# Patient Record
Sex: FEMALE | Race: WHITE | Employment: STUDENT | URBAN - METROPOLITAN AREA
[De-identification: names, ages, dates, MRNs, and addresses within clinical notes are randomized per-mention and may not be internally consistent; named-entity substitution may affect disease eponyms.]

---
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
## 2023-02-27 ENCOUNTER — TELEPHONE (OUTPATIENT)
Dept: FAMILY MEDICINE CLINIC | Facility: CLINIC | Age: 23
End: 2023-02-27

## 2023-02-27 NOTE — TELEPHONE ENCOUNTER
Tejas Tello, Nurse with [facility] calling. Asking for our fax number. RN provided OPO fax:   [phone]      And provided number for medical records as well. She verbalizes understanding of all information.

## 2023-06-28 ENCOUNTER — APPOINTMENT (OUTPATIENT)
Dept: GENERAL RADIOLOGY | Age: 23
End: 2023-06-28
Attending: PHYSICIAN ASSISTANT

## 2023-06-28 ENCOUNTER — APPOINTMENT (OUTPATIENT)
Dept: CT IMAGING | Age: 23
End: 2023-06-28
Attending: EMERGENCY MEDICINE

## 2023-06-28 ENCOUNTER — HOSPITAL ENCOUNTER (EMERGENCY)
Age: 23
Discharge: HOME | End: 2023-06-28
Attending: EMERGENCY MEDICINE

## 2023-06-28 VITALS
DIASTOLIC BLOOD PRESSURE: 71 MMHG | OXYGEN SATURATION: 98 % | HEIGHT: 63 IN | SYSTOLIC BLOOD PRESSURE: 107 MMHG | HEART RATE: 79 BPM | TEMPERATURE: 98.2 F | WEIGHT: 135 LBS | BODY MASS INDEX: 23.92 KG/M2 | RESPIRATION RATE: 17 BRPM

## 2023-06-28 DIAGNOSIS — R10.9 FLANK PAIN: ICD-10-CM

## 2023-06-28 DIAGNOSIS — R07.9 CHEST PAIN, UNSPECIFIED TYPE: ICD-10-CM

## 2023-06-28 DIAGNOSIS — N39.0 URINARY TRACT INFECTION WITHOUT HEMATURIA, SITE UNSPECIFIED: Primary | ICD-10-CM

## 2023-06-28 LAB
ALBUMIN SERPL-MCNC: 3.8 G/DL (ref 3.6–5.1)
ALBUMIN/GLOB SERPL: 1 {RATIO} (ref 1–2.4)
ALP SERPL-CCNC: 45 UNITS/L (ref 45–117)
ALT SERPL-CCNC: 22 UNITS/L
ANION GAP SERPL CALC-SCNC: 13 MMOL/L (ref 7–19)
APPEARANCE UR: ABNORMAL
APPEARANCE UR: CLEAR
AST SERPL-CCNC: 26 UNITS/L
BACTERIA #/AREA URNS HPF: ABNORMAL /HPF
BASOPHILS # BLD: 0 K/MCL (ref 0–0.3)
BASOPHILS NFR BLD: 0 %
BILIRUB SERPL-MCNC: 1.9 MG/DL (ref 0.2–1)
BILIRUB UR QL STRIP: NEGATIVE
BILIRUB UR QL STRIP: NEGATIVE
BUN SERPL-MCNC: 12 MG/DL (ref 6–20)
BUN/CREAT SERPL: 17 (ref 7–25)
CALCIUM SERPL-MCNC: 9 MG/DL (ref 8.4–10.2)
CHLORIDE SERPL-SCNC: 105 MMOL/L (ref 97–110)
CO2 SERPL-SCNC: 29 MMOL/L (ref 21–32)
COLOR UR: ABNORMAL
COLOR UR: YELLOW
CREAT SERPL-MCNC: 0.72 MG/DL (ref 0.51–0.95)
D DIMER PPP FEU-MCNC: 0.26 MG/L (FEU)
DEPRECATED RDW RBC: 38.7 FL (ref 39–50)
EOSINOPHIL # BLD: 0.2 K/MCL (ref 0–0.5)
EOSINOPHIL NFR BLD: 3 %
ERYTHROCYTE [DISTWIDTH] IN BLOOD: 13 % (ref 11–15)
FASTING DURATION TIME PATIENT: ABNORMAL H
GFR SERPLBLD BASED ON 1.73 SQ M-ARVRAT: >90 ML/MIN
GLOBULIN SER-MCNC: 3.8 G/DL (ref 2–4)
GLUCOSE SERPL-MCNC: 99 MG/DL (ref 70–99)
GLUCOSE UR STRIP-MCNC: NEGATIVE MG/DL
GLUCOSE UR STRIP-MCNC: NEGATIVE MG/DL
HCG UR QL: NEGATIVE
HCT VFR BLD CALC: 41.5 % (ref 36–46.5)
HGB BLD-MCNC: 13.8 G/DL (ref 12–15.5)
HGB UR QL STRIP: ABNORMAL
HGB UR QL STRIP: ABNORMAL
HYALINE CASTS #/AREA URNS LPF: ABNORMAL /LPF
IMM GRANULOCYTES # BLD AUTO: 0 K/MCL (ref 0–0.2)
IMM GRANULOCYTES # BLD: 0 %
KETONES UR STRIP-MCNC: NEGATIVE MG/DL
KETONES UR STRIP-MCNC: NEGATIVE MG/DL
LEUKOCYTE ESTERASE UR QL STRIP: ABNORMAL
LEUKOCYTE ESTERASE UR QL STRIP: ABNORMAL
LYMPHOCYTES # BLD: 2.1 K/MCL (ref 1–4.8)
LYMPHOCYTES NFR BLD: 28 %
MAGNESIUM SERPL-MCNC: 1.8 MG/DL (ref 1.7–2.4)
MCH RBC QN AUTO: 27.2 PG (ref 26–34)
MCHC RBC AUTO-ENTMCNC: 33.3 G/DL (ref 32–36.5)
MCV RBC AUTO: 81.7 FL (ref 78–100)
MONOCYTES # BLD: 0.5 K/MCL (ref 0.3–0.9)
MONOCYTES NFR BLD: 7 %
MUCOUS THREADS URNS QL MICRO: PRESENT
NEUTROPHILS # BLD: 4.4 K/MCL (ref 1.8–7.7)
NEUTROPHILS NFR BLD: 62 %
NITRITE UR QL STRIP: NEGATIVE
NITRITE UR QL STRIP: NEGATIVE
NRBC BLD MANUAL-RTO: 0 /100 WBC
PH UR STRIP: 7.5 UNITS (ref 5–7)
PH UR STRIP: 7.5 [PH] (ref 5–7)
PLATELET # BLD AUTO: 193 K/MCL (ref 140–450)
POTASSIUM SERPL-SCNC: 4.5 MMOL/L (ref 3.4–5.1)
PROT SERPL-MCNC: 7.6 G/DL (ref 6.4–8.2)
PROT UR STRIP-MCNC: ABNORMAL MG/DL
PROT UR STRIP-MCNC: NEGATIVE MG/DL
RBC # BLD: 5.08 MIL/MCL (ref 4–5.2)
RBC #/AREA URNS HPF: ABNORMAL /HPF
SODIUM SERPL-SCNC: 142 MMOL/L (ref 135–145)
SP GR UR STRIP: 1.01 (ref 1–1.03)
SP GR UR STRIP: 1.02 (ref 1–1.03)
SQUAMOUS #/AREA URNS HPF: ABNORMAL /HPF
TROPONIN I SERPL DL<=0.01 NG/ML-MCNC: <4 NG/L
UROBILINOGEN UR STRIP-MCNC: 0.2 MG/DL
UROBILINOGEN UR STRIP-MCNC: 0.2 MG/DL
WBC # BLD: 7.2 K/MCL (ref 4.2–11)
WBC #/AREA URNS HPF: ABNORMAL /HPF

## 2023-06-28 PROCEDURE — 80053 COMPREHEN METABOLIC PANEL: CPT | Performed by: PHYSICIAN ASSISTANT

## 2023-06-28 PROCEDURE — 84484 ASSAY OF TROPONIN QUANT: CPT | Performed by: PHYSICIAN ASSISTANT

## 2023-06-28 PROCEDURE — 85025 COMPLETE CBC W/AUTO DIFF WBC: CPT | Performed by: PHYSICIAN ASSISTANT

## 2023-06-28 PROCEDURE — 85379 FIBRIN DEGRADATION QUANT: CPT | Performed by: EMERGENCY MEDICINE

## 2023-06-28 PROCEDURE — 87077 CULTURE AEROBIC IDENTIFY: CPT | Performed by: EMERGENCY MEDICINE

## 2023-06-28 PROCEDURE — 36415 COLL VENOUS BLD VENIPUNCTURE: CPT

## 2023-06-28 PROCEDURE — 74176 CT ABD & PELVIS W/O CONTRAST: CPT

## 2023-06-28 PROCEDURE — 81003 URINALYSIS AUTO W/O SCOPE: CPT

## 2023-06-28 PROCEDURE — 93005 ELECTROCARDIOGRAM TRACING: CPT | Performed by: PHYSICIAN ASSISTANT

## 2023-06-28 PROCEDURE — 99285 EMERGENCY DEPT VISIT HI MDM: CPT

## 2023-06-28 PROCEDURE — 84703 CHORIONIC GONADOTROPIN ASSAY: CPT

## 2023-06-28 PROCEDURE — G1004 CDSM NDSC: HCPCS

## 2023-06-28 PROCEDURE — 83735 ASSAY OF MAGNESIUM: CPT | Performed by: PHYSICIAN ASSISTANT

## 2023-06-28 PROCEDURE — 71045 X-RAY EXAM CHEST 1 VIEW: CPT

## 2023-06-28 PROCEDURE — 81001 URINALYSIS AUTO W/SCOPE: CPT | Performed by: EMERGENCY MEDICINE

## 2023-06-28 RX ORDER — CEPHALEXIN 500 MG/1
500 CAPSULE ORAL 3 TIMES DAILY
Qty: 21 CAPSULE | Refills: 0 | Status: SHIPPED | OUTPATIENT
Start: 2023-06-28 | End: 2023-07-05

## 2023-06-28 ASSESSMENT — ENCOUNTER SYMPTOMS
CHILLS: 0
SHORTNESS OF BREATH: 0
HEADACHES: 0
ENDOCRINE NEGATIVE: 1
TROUBLE SWALLOWING: 0
STRIDOR: 0
COLOR CHANGE: 0
AGITATION: 0
DIZZINESS: 0
ABDOMINAL PAIN: 0
ABDOMINAL DISTENTION: 0
NUMBNESS: 0
EYE DISCHARGE: 0
CONSTITUTIONAL NEGATIVE: 1
ALLERGIC/IMMUNOLOGIC NEGATIVE: 1
VOMITING: 0
CHEST TIGHTNESS: 0
WHEEZING: 0
EYE REDNESS: 0
WEAKNESS: 0
NAUSEA: 0
EYE ITCHING: 0
SEIZURES: 0
FEVER: 0
HEMATOLOGIC/LYMPHATIC NEGATIVE: 1
FATIGUE: 0
FACIAL SWELLING: 0

## 2023-06-29 LAB
ATRIAL RATE (BPM): 81
BACTERIA UR CULT: ABNORMAL
P AXIS (DEGREES): 33
PR-INTERVAL (MSEC): 116
QRS-INTERVAL (MSEC): 76
QT-INTERVAL (MSEC): 364
QTC: 422
R AXIS (DEGREES): 66
RAINBOW EXTRA TUBES HOLD SPECIMEN: NORMAL
REPORT TEXT: NORMAL
T AXIS (DEGREES): 21
VENTRICULAR RATE EKG/MIN (BPM): 81

## 2024-02-23 ENCOUNTER — OFFICE VISIT (OUTPATIENT)
Dept: OBGYN CLINIC | Facility: CLINIC | Age: 24
End: 2024-02-23

## 2024-02-23 VITALS
HEIGHT: 62 IN | HEART RATE: 76 BPM | DIASTOLIC BLOOD PRESSURE: 75 MMHG | WEIGHT: 154 LBS | SYSTOLIC BLOOD PRESSURE: 120 MMHG | BODY MASS INDEX: 28.34 KG/M2

## 2024-02-23 DIAGNOSIS — Z83.2 FHX: FACTOR V LEIDEN MUTATION: ICD-10-CM

## 2024-02-23 DIAGNOSIS — Z32.01 PREGNANCY EXAMINATION OR TEST, POSITIVE RESULT (HCC): ICD-10-CM

## 2024-02-23 DIAGNOSIS — N92.6 MISSED MENSES: Primary | ICD-10-CM

## 2024-02-23 LAB
CONTROL LINE PRESENT WITH A CLEAR BACKGROUND (YES/NO): YES YES/NO
KIT LOT #: NORMAL NUMERIC
PREGNANCY TEST, URINE: POSITIVE

## 2024-02-23 PROCEDURE — 81025 URINE PREGNANCY TEST: CPT | Performed by: ADVANCED PRACTICE MIDWIFE

## 2024-02-23 PROCEDURE — 99203 OFFICE O/P NEW LOW 30 MIN: CPT | Performed by: ADVANCED PRACTICE MIDWIFE

## 2024-02-23 NOTE — PROGRESS NOTES
Subjective:   Patient ID: Tayler Forbes is a 23 year old female.    Gyn Exam    Patient presents for positive home pregnancy test. LMP 1/12/24. Patient reports pregnancy was unplanned. Reports good feelings about pregnancy.    Patient in monogamous relationship, with one male partner. Patient reports being in safe relationship.  Adequate family support.    Family hx: Maternal aunt had factor V Leiden mutation    History/Other:   Review of Systems   Constitutional: Negative.    Respiratory: Negative.     Cardiovascular: Negative.    Gastrointestinal: Negative.    Genitourinary: Negative.    Hematological: Negative.      No current outpatient medications on file.     Allergies:No Known Allergies    Objective:   Physical Exam  Vitals reviewed.   Pulmonary:      Effort: Pulmonary effort is normal.   Psychiatric:         Attention and Perception: Attention normal.         Mood and Affect: Mood normal.         Behavior: Behavior normal. Hyperactive: factor V leiden. Behavior is cooperative.         Judgment: Judgment normal.         Assessment & Plan:   1. Missed menses    2. Pregnancy examination or test, positive result (HCC)    3. FHx: factor V Leiden mutation    1.  Discussed importance of folic acid, calcium, vitamin D.   2.  Reviewed pregnancy recommendations regarding weight gain, diet, fish consumption, consumption of deli-meats and hot dogs, making sure food is appropriately cooked and washed to avoid food-borne illnesses.    3.  Travel discussed, avoid travel to Covid zones, use of support stockings with flights longer than 3 hours, movement every 2-3 hours if driving  4.  Discussed exercise, avoid jacuzzi, sauna, hot tubs and steam rooms.   5.  Discussed avoidance of alcohol, smoking, and minimizing caffeine.    6.  Warning signs reviewed advised to call office if occur.    7.  First Trimester chromosomal screening Cell free Fetal DNA and US schedule discussed.  Desires First trimester screen testing  8.  Genetic screening and ACOG/ ACMG guidelines for CF, SMA, Fragile X & hemoglobinopathies.  Informrd our offices uses the Preparent Standard Panel but other sources are available.  If interested they should check their insurance and various costs  9. ONTD risks discussed. No risk factors identified.  Start 4000 mcg Folic acid.   10. Pre- Eclampsia Risk Assessment:   High risk Factors- none  Moderate Risk Factors: Nullip  11.  Schedule RN OB ED visit   12.  30 minutes face to face counseling, chart review, orders and coordination of care  13. Discussed early ultrasound for verification of dates of pregnancy. Ultrasound order placed    Blood testing for Factor 5 Jabier ordered    Orders Placed This Encounter   Procedures    POC Urine pregnancy test [74181]    Factor V Leiden Mutation       Meds This Visit:  Requested Prescriptions      No prescriptions requested or ordered in this encounter       Imaging & Referrals:  US PREG 1ST TRIMESTER (CPT=76801)      Assessment and Plan completed by Lacy JUAREZ under the direct supervision of Kandi Leonard CNM    I personally performed the patient's exam and medical decision making on this date of service.  I was physically present in the room for the performance of the E/M service.  I have reviewed the JCARLOS student's documentation and findings including history, Exam, and Medical Decision Making, edited the document for accuracy and verify that it represents the clinical findings and services performed.

## 2024-02-23 NOTE — PROGRESS NOTES
I personally performed the patient's exam and medical decision making on this date of service.  I was physically present in the room for the performance of the E/M service.  I have reviewed the CNM student's documentation and findings including history, Exam, and Medical Decision Making, edited the document for accuracy and verify that it represents the clinical findings and services performed.     Diagnoses and all orders for this visit:    Missed menses  -     POC Urine pregnancy test [90224]  -     US PREG 1ST TRIMESTER (CPT=76801); Future    Pregnancy examination or test, positive result (HCC)    FHx: factor V Leiden mutation  -     Factor V Leiden Mutation

## 2024-02-28 ENCOUNTER — HOSPITAL ENCOUNTER (OUTPATIENT)
Dept: ULTRASOUND IMAGING | Facility: HOSPITAL | Age: 24
Discharge: HOME | End: 2024-02-28
Attending: ADVANCED PRACTICE MIDWIFE
Payer: COMMERCIAL

## 2024-02-28 DIAGNOSIS — N92.6 MISSED MENSES: ICD-10-CM

## 2024-02-28 PROCEDURE — 76817 TRANSVAGINAL US OBSTETRIC: CPT | Performed by: ADVANCED PRACTICE MIDWIFE

## 2024-02-28 PROCEDURE — 76801 OB US < 14 WKS SINGLE FETUS: CPT | Performed by: ADVANCED PRACTICE MIDWIFE

## 2024-02-29 ENCOUNTER — TELEPHONE (OUTPATIENT)
Dept: OBGYN CLINIC | Facility: CLINIC | Age: 24
End: 2024-02-29

## 2024-02-29 NOTE — TELEPHONE ENCOUNTER
Name and  verified    Patient given MES results and voiced understanding. Has nurse education schedule, aware this is a phone call.

## 2024-02-29 NOTE — TELEPHONE ENCOUNTER
----- Message from Kandi Leonard CNM sent at 2/29/2024  1:01 PM CST -----  Please call:  Single live early intrauterine gestation measures 6 weeks 6 days.  SINDHU 10/17/2024.'    Needs RN ed visit

## 2024-03-06 ENCOUNTER — NURSE ONLY (OUTPATIENT)
Dept: OBGYN CLINIC | Facility: CLINIC | Age: 24
End: 2024-03-06
Payer: COMMERCIAL

## 2024-03-06 DIAGNOSIS — Z34.01 ENCOUNTER FOR SUPERVISION OF NORMAL FIRST PREGNANCY IN FIRST TRIMESTER (HCC): Primary | ICD-10-CM

## 2024-03-06 NOTE — PROGRESS NOTES
Pt Name and  verified.    OB History    Para Term  AB Living   1 0 0 0 0 0   SAB IAB Ectopic Multiple Live Births   0 0 0 0 0     Pt called today for RN OB Education. Theodosia of MW through her mother, she is a pt of [facility]. Vaginal delivery un-medicated if possible.   LMP: 2024     Pre  BMI: 27.06    EPDS score: 0/30    Working SINDHU: 10/08/2024   Hx of genetic abnormality in family: none, partners brothers have moderate autism.   Covid Vaccine: not vaccinated     Sterilization/Contraception: abstaining, no other form of pregnancy     OUD Screening: Pt. Has answered NO 5P questions and has NO  risk factors.    Pt. Given What pregnant women need to know handout.    SDOH Screening: Low Risk    Educational material reviewed with patient: Prenatal care, nutrition, weight gain recommendations, travel, exercise, intercourse, pregnancy changes, safe medications, pregnancy and work, fetal movement, labor and  labor, warning signs, food safety, tdap, cord blood, breastfeeding breast, circumcision yes, and Group B strep.     Blood transfusion if needed: yes, if needed in PN care.      PN labs: placed and pt made aware to complete prior to next apt.     Optional genetic screening labs were reviewed: Cell FreeDNA, FTS with US, Quad screen MSAFP and CF screening. Ciarra testing to be done.     [facility] Policy: reviewed and VU.    NOB apt:  TM scheduled.

## 2024-03-07 ENCOUNTER — TELEPHONE (OUTPATIENT)
Dept: OBGYN CLINIC | Facility: CLINIC | Age: 24
End: 2024-03-07

## 2024-03-07 NOTE — TELEPHONE ENCOUNTER
Patient request for a nurse to call, patient states on 03/29/24 will come in to do  blood work.   Patient ask on that day will she get a test to tell the gender of the baby.

## 2024-03-07 NOTE — TELEPHONE ENCOUNTER
Name and  verified    Patient informed dilia kit is ready, but can look into pricing of [id number] if desires. Number provided for insurance price estimate.

## 2024-03-29 ENCOUNTER — LAB ENCOUNTER (OUTPATIENT)
Dept: LAB | Facility: HOSPITAL | Age: 24
End: 2024-03-29
Attending: ADVANCED PRACTICE MIDWIFE
Payer: COMMERCIAL

## 2024-03-29 ENCOUNTER — INITIAL PRENATAL (OUTPATIENT)
Dept: OBGYN CLINIC | Facility: CLINIC | Age: 24
End: 2024-03-29
Payer: COMMERCIAL

## 2024-03-29 VITALS
BODY MASS INDEX: 28 KG/M2 | WEIGHT: 151 LBS | DIASTOLIC BLOOD PRESSURE: 75 MMHG | SYSTOLIC BLOOD PRESSURE: 110 MMHG | HEART RATE: 88 BPM

## 2024-03-29 DIAGNOSIS — Z11.3 SCREEN FOR STD (SEXUALLY TRANSMITTED DISEASE): ICD-10-CM

## 2024-03-29 DIAGNOSIS — Z83.2 FHX: FACTOR V LEIDEN MUTATION: ICD-10-CM

## 2024-03-29 DIAGNOSIS — Z34.01 PRENATAL CARE, FIRST PREGNANCY IN FIRST TRIMESTER (HCC): Primary | ICD-10-CM

## 2024-03-29 DIAGNOSIS — R00.2 PALPITATIONS: ICD-10-CM

## 2024-03-29 DIAGNOSIS — Z12.4 PAP SMEAR FOR CERVICAL CANCER SCREENING: ICD-10-CM

## 2024-03-29 DIAGNOSIS — Z34.01 ENCOUNTER FOR SUPERVISION OF NORMAL FIRST PREGNANCY IN FIRST TRIMESTER (HCC): ICD-10-CM

## 2024-03-29 DIAGNOSIS — I47.10 SVT (SUPRAVENTRICULAR TACHYCARDIA) (HCC): ICD-10-CM

## 2024-03-29 LAB
ANTIBODY SCREEN: NEGATIVE
BASOPHILS # BLD AUTO: 0.01 X10(3) UL (ref 0–0.2)
BASOPHILS NFR BLD AUTO: 0.2 %
BILIRUB UR QL: NEGATIVE
CLARITY UR: CLEAR
COLOR UR: YELLOW
DEPRECATED RDW RBC AUTO: 37.9 FL (ref 35.1–46.3)
EOSINOPHIL # BLD AUTO: 0.05 X10(3) UL (ref 0–0.7)
EOSINOPHIL NFR BLD AUTO: 1.1 %
ERYTHROCYTE [DISTWIDTH] IN BLOOD BY AUTOMATED COUNT: 12.3 % (ref 11–15)
EST. AVERAGE GLUCOSE BLD GHB EST-MCNC: 111 MG/DL (ref 68–126)
GLUCOSE UR-MCNC: NORMAL MG/DL
HBA1C MFR BLD: 5.5 % (ref ?–5.7)
HCT VFR BLD AUTO: 40.9 %
HGB BLD-MCNC: 13.5 G/DL
HGB UR QL STRIP.AUTO: NEGATIVE
IMM GRANULOCYTES # BLD AUTO: 0.01 X10(3) UL (ref 0–1)
IMM GRANULOCYTES NFR BLD: 0.2 %
KETONES UR-MCNC: 20 MG/DL
LEUKOCYTE ESTERASE UR QL STRIP.AUTO: NEGATIVE
LYMPHOCYTES # BLD AUTO: 0.94 X10(3) UL (ref 1–4)
LYMPHOCYTES NFR BLD AUTO: 21 %
MCH RBC QN AUTO: 27.9 PG (ref 26–34)
MCHC RBC AUTO-ENTMCNC: 33 G/DL (ref 31–37)
MCV RBC AUTO: 84.5 FL
MONOCYTES # BLD AUTO: 0.43 X10(3) UL (ref 0.1–1)
MONOCYTES NFR BLD AUTO: 9.6 %
NEUTROPHILS # BLD AUTO: 3.04 X10 (3) UL (ref 1.5–7.7)
NEUTROPHILS # BLD AUTO: 3.04 X10(3) UL (ref 1.5–7.7)
NEUTROPHILS NFR BLD AUTO: 67.9 %
NITRITE UR QL STRIP.AUTO: NEGATIVE
PH UR: 6.5 [PH] (ref 5–8)
PLATELET # BLD AUTO: 185 10(3)UL (ref 150–450)
RBC # BLD AUTO: 4.84 X10(6)UL
RH BLOOD TYPE: POSITIVE
SP GR UR STRIP: 1.02 (ref 1–1.03)
UROBILINOGEN UR STRIP-ACNC: NORMAL
WBC # BLD AUTO: 4.5 X10(3) UL (ref 4–11)

## 2024-03-29 PROCEDURE — 83036 HEMOGLOBIN GLYCOSYLATED A1C: CPT

## 2024-03-29 PROCEDURE — 83020 HEMOGLOBIN ELECTROPHORESIS: CPT

## 2024-03-29 PROCEDURE — 86787 VARICELLA-ZOSTER ANTIBODY: CPT

## 2024-03-29 PROCEDURE — 83021 HEMOGLOBIN CHROMOTOGRAPHY: CPT

## 2024-03-29 PROCEDURE — 86780 TREPONEMA PALLIDUM: CPT

## 2024-03-29 PROCEDURE — 86850 RBC ANTIBODY SCREEN: CPT

## 2024-03-29 PROCEDURE — 86900 BLOOD TYPING SEROLOGIC ABO: CPT

## 2024-03-29 PROCEDURE — 87389 HIV-1 AG W/HIV-1&-2 AB AG IA: CPT

## 2024-03-29 PROCEDURE — 86762 RUBELLA ANTIBODY: CPT

## 2024-03-29 PROCEDURE — 36415 COLL VENOUS BLD VENIPUNCTURE: CPT

## 2024-03-29 PROCEDURE — 81003 URINALYSIS AUTO W/O SCOPE: CPT

## 2024-03-29 PROCEDURE — 85025 COMPLETE CBC W/AUTO DIFF WBC: CPT

## 2024-03-29 PROCEDURE — 86901 BLOOD TYPING SEROLOGIC RH(D): CPT

## 2024-03-29 PROCEDURE — 81241 F5 GENE: CPT | Performed by: ADVANCED PRACTICE MIDWIFE

## 2024-03-29 PROCEDURE — 86803 HEPATITIS C AB TEST: CPT

## 2024-03-29 PROCEDURE — 81240 F2 GENE: CPT | Performed by: ADVANCED PRACTICE MIDWIFE

## 2024-03-29 PROCEDURE — 87340 HEPATITIS B SURFACE AG IA: CPT

## 2024-03-29 PROCEDURE — 87086 URINE CULTURE/COLONY COUNT: CPT

## 2024-03-29 NOTE — PROGRESS NOTES
Tayler, , is at 12w3d, here for her NOB visit.  Currently, she is feeling well. Denies 1st trimester danger signs.     NOB labs- not completed,   Genetic screening- looking into insurance.  Ultrasound: 24 measuring 6 weeks 6 days. Re-dated pregnancy  Med hx: Periods of SVT that happen about once a month, lasting one minute. No medication. This information was not revealed to provider at missed menses visit  Family hx: factor V leiden- two maternal aunts  Allergies: NKDA   Problem list- Updated  Abuse/Feel safe in home- Lives with parents in safe enviroment  [unfilled] 0    Pre-e risk:   Prior births:none    Physical: completed  Pap: collected today    Lacy JUAREZ and Lilia Dove CNM present for exam    Vital signs and weight reviewed  See flowsheets  Prenatal Physical  Unable to heart FHTs via doppler    Patient Active Problem List   Diagnosis    FHx: factor V Leiden mutation    Palpitations    SVT (supraventricular tachycardia)      Assessment/Plan: PE, pap, and GC/CT today. Patient to notify us if she would like NIPT testing ordered  SINDHU adjusted due to ultrasound on . Dating tab updated  Cardiology consult placed due to H/O of SVT. Will consider transfer of care pending cardiology evaluation  Next visit: 4weeks    Reviewed:   Prenatal visit schedule  CNM care  Emergency contact info   1st trimester precautions and expectations  Danger signs    Pt verbalized understanding. All questions answered. No barriers to learning identified    Assessment and Plan Completed by Lacy JUAREZ under the direct supervision of Lilia Dove CNM    Patient seen in conjunction with [facility]. I personally witnessed the patient's exam and medical decision making on this date of service.  I was physically present in the room for the performance of the E/M service.  I have reviewed the JCARLOS student's documentation and findings including history, Exam, and Medical Decision Making, edited the document for accuracy and  verify that it represents the clinical findings and services performed.

## 2024-03-29 NOTE — PROGRESS NOTES
Patient seen in conjunction with [facility]. I personally witnessed the patient's exam and medical decision making on this date of service.  I was physically present in the room for the performance of the E/M service.  I have reviewed the CNM student's documentation and findings including history, Exam, and Medical Decision Making, edited the document for accuracy and verify that it represents the clinical findings and services performed.

## 2024-03-29 NOTE — PATIENT INSTRUCTIONS
Healthy Eating Habits During Pregnancy    It’s important to develop healthy eating habits while you are pregnant, for you as well as for your baby. Here are some ways to stay healthy.  Aim for a healthy weight  A slow, steady rate of weight gain is often best. After the first trimester, you may gain about a pound a week. If you were overweight before pregnancy, you need to gain fewer pounds. Your healthcare provider can give you a healthy weight goal for your pregnancy.  Don’t diet  Now is not the time to diet. You may not get enough of the nutrients you and your baby need. Instead, learn how to be a healthy eater. Start by doing it for your baby. Soon, you may do it for yourself.  Vitamins and supplements  Talk with your healthcare provider about taking these and other prenatal vitamins and supplements.  Iron makes the extra blood you need now.  Calcium and vitamin D help build and keep strong bones.  Folic acid helps prevent certain birth defects.  Iodine helps the thyroid work right.  Some vitamins may not be safe to take. Your healthcare provider will tell you which ones to avoid.  Fluids  Drink at least 8 to 10 cups of fluid daily. Your baby needs fluids. Fluids also decrease constipation, flush out toxins and waste, limit swelling, and help prevent bladder infections. Water is best. Other good choices are:  Water or seltzer water with a slice of lemon or lime (These can also help ease an upset stomach.)  Clear soups that are low in salt  Low-fat or fat-free milk, soy or rice milk with calcium added  Popsicles or gelatin  Things to avoid  Some things might harm your growing baby. Don’t eat or drink:  Alcohol  Unpasteurized dairy foods and juices  Raw or undercooked meat, poultry, fish, or eggs  Unwashed fruits and vegetables  Prepared meats, like deli meats or hot dogs, unless heated until steaming hot  Fish that are high in mercury, like shark, swordfish, melisa mackerel, tilefish, and albacore tuna  Things to  limit  Ask your healthcare provider whether it’s safe to eat or drink:  Caffeine  Artificial sweeteners  Organ meats  Certain types of fish  Fish and shellfish that contain mercury in lower amounts, like shrimp, canned light tuna, salmon, pollock, and catfish  Tobi last reviewed this educational content on 2018 [employer]. [address] [place] [address], [place] [address]. All rights reserved. This information is not intended as a substitute for professional medical care. Always follow your healthcare professional's instructions.        Nutrition During Pregnancy    Having a healthy baby depends mostly on you. What you eat matters to your baby and your health. During pregnancy, you will likely need about 300 more calories per day than before you became pregnant. Each day, try to eat the number of servings listed here for each food group. In addition, cut down on salt and caffeine. Limit the amount of sweets and high-fat foods you eat. Don’t smoke or drink alcohol.  Important: See your healthcare provider as often as requested. If you have any questions, be sure to ask them.  Fruits  2 cups  Examples of 1-cup servings:  1 medium apple  1 medium orange  1 medium banana  1 cup chopped fruit  1 cup 100% fruit juice (pasteurized)  1/2 cup dried fruit Vegetables  2-1/2 to 3 cups   Examples of 1 servin cups raw, leafy greens  1 cup raw or cooked cut-up vegetables  1 cup 100% vegetable juice (pasteurized) Grains & Cereals*  6 to 8 ounces  Examples of 1-ounce servings:  1 slice bread  1/2 cup cooked rice  1/2 cup cooked cereal  1/2 cup pasta  1 ounce cold cereal Fats & Oils  6 to 8 teaspoons   Dairy**  3 cups  Examples of 1-cup servings:  1 cup milk  1 cup yogurt  1-1/2 ounces natural cheese  2 ounces processed cheese Protein---  5 to 6-1/2 ounces  Examples of 1-ounce servings:  1 egg  1 ounce of lean meat, poultry, or fish  1/4 cup cooked beans  1 tablespoon peanut butter  1/2 ounce nuts  Fluids  8 or more 8-ounce glasses  Examples:  Water  Diluted juices: Apple, orange, cranberry  Mineral water  Clear soups, broth     *Note: Choose whole grains whenever possible.  ** Note: Try to choose low-fat options; avoid soft cheeses and unpasteurized milk.  --- Notes: Avoid raw or undercooked meats, eggs, and seafood. fish, and shellfish. Also, some types of fish, like shark, swordfish, and melisa mackerel should not be eaten during pregnancy. Avoid hot dogs, luncheon meats, and cold cuts unless heated to steaming just before being served. Ask your healthcare provider about safe choices.     Prenatal supplements  A prenatal supplement is a pill that you take daily during pregnancy. It helps make sure you’re getting the right amount of certain nutrients that are important to your baby. Ask your healthcare provider to help you choose the best one for you. Important nutrients during pregnancy include:  Folic acid. It's best to start taking this supplement 1 month before you start trying to get pregnant. Folic acid helps prevent certain problems in your baby. During pregnancy, you need to take 400 micrograms (mcg) of folic acid every day for the first 2 to 3 months after conception, and then 600 mcg is needed for growing fetus and placenta.  Iron, calcium, and vitamin D. You may also be advised to take these supplements during pregnancy. They help keep you and your baby healthy. Be sure to take them at different times because calcium makes it hard for the body to absorb iron. Taking iron with orange juice helps to increase its absorption.   [employer] last reviewed this educational content on 12/1/2017 © 2000-2020 [employer]. [address] [place] [address], [place] [address]. All rights reserved. This information is not intended as a substitute for professional medical care. Always follow your healthcare professional's instructions.        Pregnancy: Planning Your Exercise Routine    While you’re pregnant, an  exercise routine helps both your mind and your body feel good. It tones your muscles and makes them stronger. It also gives you and your baby more oxygen.  The right exercise for you  Overall conditioning is best for you and your baby. Try walking, swimming, or riding a stationary bike. Always warm up, cool down, and drink enough fluids. Keep a snack close by in case your blood sugar gets low. Discuss exercise choices with your healthcare provider. Talk about the following:  If you already exercise, find out how to adapt your routine while you’re pregnant. Keep the intensity of the exercise moderate.  Ask if there are any local prenatal exercise classes, like yoga or water aerobics. Find out which prenatal exercise videos are good choices.  If you were not exercising before your pregnancy, find out the best way to start. Now is not the time to begin a new workout on your own. Start slowly. Listen to your body.  Ask which forms of exercise you should avoid. These may include risky activities like hot yoga, horseback riding, scuba diving, skiing, skating, and contact sports.  Pelvic tilts  These help strengthen your stomach muscles and low back. You can do pelvic tilts instead of sit-ups.  Do this exercise on your hands and knees.  Relax the back of your neck. Pull your stomach in until your low back flattens.  Hold for 30 seconds. Release. Repeat 10 times. Do this twice a day.  Kegel exercises  Kegel exercises strengthen the pelvic muscles. Doing Kegels daily helps prepare these muscles for delivery. Kegels also help ease your recovery. You exercise these muscles by tightening, holding, then relaxing them. To do 1 type of Kegel exercise, contract as if you were stopping your urine stream (but do it when you’re not urinating). Hold for 10 seconds, then repeat 10 times, a few times a day.  Tips to add activity  Here are some tips to follow:  Park the car farther from a store and walk.  If you can, do errands on foot  instead of driving.  Walk across the office to talk to someone in person instead of calling.  While waiting for appointments, go up and down stairs or around the block.   Tips to stay active  Here are some tips to follow:  Maintain your routine. But exercise less intensely if you feel tired.  Base your workout on how you feel, not your heart rate. Heart rates aren’t a good way to measure effort during pregnancy.  Avoid exercising on your back after week 16.   What are the warning signs that I should stop exercising?  Stop exercising and call your healthcare provider if you have any of these symptoms:  Vaginal bleeding   Dizziness or feeling faint   Increased shortness of breath   Chest pain   Headache   Muscle weakness   Calf (back of the leg) pain or swelling    Uterine contractions or pre-term labor   Decreased fetal movement   Fluid leaking (or gushing) from your vagina  [employer] last reviewed this educational content on 1/1/2018  © 7623-9320 [employer]. [address] [place] [address]. All rights reserved. This information is not intended as a substitute for professional medical care. Always follow your healthcare professional's instructions.        Exercise During Pregnancy  Regular exercise can help you adapt to the changes your body is going through during pregnancy. Exercising may help you relax, and it gets you ready for labor and delivery. Talk to your healthcare provider about the kinds of activities you can do. Then go ahead and enjoy them.    Get started  Even if you didn’t exercise before pregnancy, it is not too late to start. Choose an activity that you like and that fits your lifestyle. Begin slowly and build up a little at a time. Be sure to check with your healthcare provider before starting any exercise program. The following tips may help you get started:  Choose a time and place to exercise each day.  Wear loose-fitting clothes and comfortable athletic shoes.  Stretch  before and after you exercise. (Be sure to stretch slowly and to hold stretches for 30 to 40 seconds.)  Be active  Unless your healthcare provider says otherwise, try to exercise for 30 minutes or more most days of the week:  Overall conditioning, like swimming, bicycling, or walking, is especially beneficial.  Aerobics and exercises that increase your pulse rate help condition your body and strengthen your heart. Ask about special prenatal aerobics classes.  Exercise safely  These tips will help you have a safe, healthy workout:  Stay cool. Stop exercising if you feel overheated.  Slow down if you’re out of breath. If you can’t talk during exercise, lower the intensity of the workout.  Monitor the intensity of your workout. Only do moderate-intensity (not strenuous) exercise.  Stay off your back. Lying on your back can decrease blood flow to your baby.  Drink water before, during and after your workout.  Eat 300 extra calories a day. A light snack before and after you exercise will help keep your energy up.  Avoid activities requiring balancing skills later in pregnancy.  Do Kegel exercises  Kegel exercises strengthen the pelvic floor muscles used in childbirth. These muscles are the same ones used to stop the flow of urine. Do Kegel exercises daily:  Squeeze your pelvic floor muscles for a count of 3.  Relax, then squeeze again.  Repeat 10 to 15 times in a row at least 3 times a day.  You can do Kegel exercises anytime and anywhere.  Keep walking  No matter what other exercise you do, try to walk whenever you can:  If you’re working all day, take a lunchtime walk in the park with a friend.  When you shop, park away from the store entrance and walk the extra distance.  Take the stairs instead of the elevator.     When to stop exercising and call your healthcare provider  Call your healthcare provider right away if you have:  Shortness of breath before starting exercise  Vaginal bleeding  Dizziness or feeling  faint  Chest pain  Headache  Decreased fetal movement   contractions   Muscle weakness  Calf pain or swelling  Fluid leaking from the vagina   [employer] last reviewed this educational content on 2017 [employer]. [address] [place] [address], [place] [address]. All rights reserved. This information is not intended as a substitute for professional medical care. Always follow your healthcare professional's instructions.        Pregnancy: Your Weight     Your weight will be checked regularly by your healthcare provider.   Being a healthy weight is important for both you and your baby. The weight you gain now is not just extra fat. It is also the weight of your baby. And it is the increased blood and fluids to support the baby. A slow, steady rate of gain is best. How much you should gain depends on your weight before getting pregnant. Check with your healthcare provider to find out what is right for you.  Talk to your healthcare provider if you have any questions.   If you gain too much  Gaining too much weight might cause you to feel tired or you could have a harder pregnancy or birth. If you and your healthcare provider decide you’re gaining too much:  Eat fewer fats and sugars. Instead, eat fruit, vegetables, and whole-grain foods.  Drink plenty of water between meals.  Get at least 20 minutes of light exercise, like walking, each day.  Don’t diet. You might not get enough of the nutrients you or your baby needs.  Keep a diet diary to help you gauge what and how much you are eating.  If you’re not gaining enough  If you don’t gain enough, your baby could be too small or have health problems. Women tend to gain most of their weight in the second and third trimesters. For now:  Eat many types of foods. Make sure you get enough calcium, protein, and carbohydrates.  Don’t skip meals.  Eat healthy snacks.  Pick nutrient-dense, high-calorie healthy food like trail mix or protein  louis.  See a dietitian for help.  Talk to your healthcare provider if you have had an eating disorder or problems with certain foods.  The following are ways to get more calories:  Eat breakfast every day. Peanut butter or a slice of cheese on toast can give you an extra protein boost.  Snack between meals. Yogurt and dried fruits can provide protein, calcium, and minerals.  Try to eat more foods that are high in good fats, like nuts, fatty fish, avocados, and olive oil.  Drink juices made from real fruit that are high in vitamin C or beta carotene, like grapefruit juice, orange juice, papaya nectar, apricot nectar, and carrot juice.  Avoid junk food, like foods high in sugar.  [employer] last reviewed this educational content on 1/1/2018 © 2000-2020 The [employer]. [address] [place] [address]. All rights reserved. This information is not intended as a substitute for professional medical care. Always follow your healthcare professional's instructions.        Dental Care for Pregnant Women  Pregnancy is a time when your oral health needs more attention. Hormone changes during pregnancy can cause certain problems with teeth and gums, and make treatment more complicated.  How pregnancy affects oral health  During pregnancy, hormone changes can cause swollen, bleeding, and irritated gums (gingivitis). Your gums may be very sore, and brushing and flossing may cause discomfort. If left untreated, gingivitis can lead to a more serious gum disease called periodontitis. Severe periodontitis can lead to tooth loss.  Some pregnant women also have small bright-red growths on their gums that bleed easily. These are often called “pregnancy tumors.” They are not cancer. They usually go away right after birth. Talk with your dentist or healthcare provider if you have concerns.  Keeping a healthy mouth  Brush twice daily with fluoride toothpaste. Floss at least once a day.  If you have morning sickness,  rinse your mouth with a teaspoon of baking soda mixed with water after vomiting. Do not brush your teeth right after vomiting. This can remove tooth enamel.  See your dentist for cleanings and checkups more often if needed. This is especially true in your second and third trimesters.  Ask your dentist or healthcare provider if you should use a special mouth rinse to help prevent gingivitis.  Tell your dentist or healthcare provider about any changes in your mouth, such as soreness or bleeding.  Safety concerns  Make sure to tell your dentist that you’re pregnant. He or she can help you stay safe. If you need to have dental X-rays during pregnancy, he or she will make sure you are fully protected. You will wear a lead apron over your belly during the X-ray process. The apron helps block radiation from the X-rays.  If you need to take medicines like antibiotics or pain relievers for dental problems, talk with your healthcare providers first. Your providers will discuss the risks and benefits of taking these during pregnancy.  If you have a high-risk pregnancy, your dentist and your healthcare provider may advise that certain dental treatments wait until after you give birth.  StayWell last reviewed this educational content on 12/1/2017 © 2000-2020 [employer]. [address] [place] [address], [place] [address]. All rights reserved. This information is not intended as a substitute for professional medical care. Always follow your healthcare professional's instructions.       Pregnancy: Your First Trimester Changes  The first trimester is a time of rapid development for your baby. Because your baby is growing so quickly, it is important that you start a healthy lifestyle right away. By the end of the first trimester, your baby has formed all of its major body organs and weighs just over an ounce.     Actual size of baby is 1/4\"    Month 1 (weeks 1 to 4)  The placenta (the organ that nourishes your baby) begins to  form. The brain, spinal cord, heart, gastrointestinal tract, and lungs begin to develop. Your baby is about 1/4-inch long by the end of the first month.     Actual size of baby is 1\"      Month 2 (weeks 5 to 8)  All of your baby’s major body organs form. The face, fingers, toes, ears, and eyes appear. By the end of the month, your baby is about 1-inch long.     Actual size of baby is 3\"      Month 3 (weeks 9 to 12)  Your baby can open and close its fists and mouth. The sexual organs begin to form. As the first trimester ends, your baby is about 3-inches long.  StayWell last reviewed this educational content on 10/1/2017  © 3128-8065 [employer]. [address] [place] [address], [place] [address]. All rights reserved. This information is not intended as a substitute for professional medical care. Always follow your healthcare professional's instructions.        Adapting to Pregnancy: First Trimester    As your body adjusts, you may have to change or limit your daily activities. You’ll need more rest. You may also need to use the energy you have more wisely.  Your changing body  Almost every part of your body is affected as you adapt to pregnancy. The uterus and cervix will begin to soften right away. You may not look very pregnant during the first 3 months. But you are likely to have some common signs of early pregnancy:  Nausea  Fatigue  Frequent urination  Mood swings  Bloating of the belly  Constipation  Heartburn  Missed or light periods (first trimester bleeding)  Nipple or breast tenderness and breast swelling  It’s not too late to start good habits  What matters most is protecting your baby from this moment on. If you smoke, drink alcohol, or use drugs, now is the time to stop. If you need help, talk with your healthcare provider:  Smoking increases the risk of stillbirth or having a low-birth-weight baby. If you smoke, quit now.  Alcohol and drugs have been linked with miscarriage, birth defects,  intellectual disability, and low birth weight. Do not drink alcohol or take drugs.  Tips to relieve nausea  Although nausea can happen at any time of the day, it may be worse in the morning. To help prevent nausea:  Eat small, light meals at frequent intervals.  Drink fluids often.  Get up slowly. Eat a few unsalted crackers before you get out of bed.  Avoid smells that bother you.  Avoid spicy and fatty foods.  Eat an ice pop in your favorite flavor.  Get plenty of rest.  Ask your healthcare provider about taking olamide or vitamin B6 for nausea and vomiting.  Talk with your healthcare provider if you take vitamins that upset your stomach.  Work concerns  The end of the first trimester is a good time to discuss working during pregnancy with your employer. Follow your healthcare provider’s advice if your job needs you to stand for a long time, work with hazardous tools, or even sit at a desk all day. Your workspace, workload, or scheduled hours may need to be adjusted. Perhaps you can change body postures more often or take an extra break.  Advice for travel  Talk to your healthcare provider first, but the second trimester may be the best time for any travel. You may be advised to avoid certain trips while you’re pregnant. Food and water can be concerns in developing countries. Travel by car is a good choice, as you can stop, get out, and stretch. Bring snacks and water along. Fasten the lap belt below your belly, low over your hips. Also be sure to wear the shoulder harness.  Intimacy  Unless your healthcare provider tells you to, there is no reason to stop having sex while you’re pregnant. You or your partner may notice changes in desire. Desire may be less in the first trimester, due to nausea and fatigue. In the second trimester, sex may be very enjoyable. The third trimester can be a challenge comfort-wise. Try different positions and see what’s best for you both.  StayWell last reviewed this educational content  on 10/1/2017  © 0930-6799 [employer]. [address] [place] [address], [place] [address]. All rights reserved. This information is not intended as a substitute for professional medical care. Always follow your healthcare professional's instructions.        Comfort Tips During Pregnancy    Talk with your healthcare provider before using pain-relieving medicine at any time during your pregnancy.  First trimester tips  Nausea  Get up slowly. Eat a few unsalted crackers before you get out of bed.  Avoid smells that bother you.  Eat small bland low fat, light high-protein meals at frequent intervals.  Sip on water, weak tea, or clear soft drinks, like ginger ale. Eat ice chips.  Fatigue  Take catnaps when you can.  Get regular exercise.  Accept help from others.  Practice good sleep habits, like going to bed and getting up at the same time each day. Use your bed only for sleep and sex.  Mood swings  Talk about your feelings with others, including other mothers.  Limit sugar, chocolate, and caffeine.  Eat a healthy diet. Don’t skip meals.  Get regular exercise.  Headaches  Get fresh air and exercise.  Relax and get enough rest.  Check with your healthcare provider before taking any pain medicines.  Second trimester tips  Here are some suggestions to help you cope:  To limit ankle swelling, sit with your feet raised or wear support hose.  If you have pain in your groin and stomach (round ligament pain), avoid sudden twisting movements.  For leg cramps, flexing your foot often brings immediate relief. You also may try massaging your calf in long, downward strokes, or stretching your legs before going to bed. Get enough exercise and wear shoes with flexible soles.  Third trimester tips  Reducing heartburn  Eat small, light meals throughout the day rather than 3 large ones.  Sleep with your upper body raised 6 inches. Don’t lie down until 2 hours after you eat.  Don't eat greasy, fried, or spicy foods.  Avoid citrus  fruits and juices.  Treating constipation  Eat foods high in fiber (whole-grain foods, fresh fruit and vegetables).  Drink plenty of water.  Get regular exercise.  Discuss other medicines (like docusate and psyllium) with your healthcare provider.  Taking care of your breasts  Avoid using harsh soaps or alcohol, which can cause excessive dryness.  Wear nursing bras. They provide more support than regular bras and can be used after pregnancy if you breastfeed.  Getting a good night’s sleep  Take a warm shower before bed.  Sleep on a firm mattress.  Lie on your side with 1 leg crossed over the other.  Use pillows to support arms, legs, and belly.  [employer] last reviewed this educational content on 10/1/2017  © 1195-6480 [employer]. [address] [place] [address], [place] [address]. All rights reserved. This information is not intended as a substitute for professional medical care. Always follow your healthcare professional's instructions.        Bleeding During Early Pregnancy     Ultrasound can help check the health of your fetus.     If you’ve had bleeding early in your pregnancy, you’re not alone. Many other pregnant women have had early bleeding, too. And in most cases, nothing is wrong. But your healthcare provider still needs to know about it. He or she may want to do tests to find out why you’re bleeding. Call your healthcare provider if you notice bleeding during pregnancy.  What causes early bleeding?  The cause of bleeding early in pregnancy is often unknown. But many factors early on in pregnancy may lead to bleeding or spotting. These include sexual intercourse, which may cause bleeding in any trimester. Here are some other causes:  Implantation of the embryo on the uterine wall  Subchorionic hemorrhage (bleeding between the sac membrane and the uterus)  Miscarriage  Ectopic (tubal) pregnancy  If you notice spotting  Spotting (very light bleeding) is the most common type of bleeding in early  pregnancy. If you notice it, call your healthcare provider. Chances are, he or she will tell you that you can care for yourself at home.  If tests are needed  Depending on how much you bleed, your healthcare provider may ask you to come in for some tests. A pelvic exam, for instance, can help see how far along your pregnancy is. You also may have an ultrasound or a Doppler test. These imaging tests use sound waves to check the health of your fetus. The ultrasound may be done on your belly or inside your vagina. Your healthcare provider also may order a special blood test. This test compares your hormone levels in blood samples taken 2 days apart. The results can help your healthcare provider learn more about the implantation of the embryo. Your blood type will also need to be checked to evaluate whether you will need to be treated for Rh sensitization.   Warning signs  If your bleeding doesn’t stop or if you notice any of the following, seek medical help right away:  Soaking a sanitary pad each hour  Bleeding like you’re having a period  Cramping or severe belly pain  Feeling dizzy or faint  Tissue passing through your vagina  Bleeding at any time after the first trimester  Questions you may be asked  Though not normal, bleeding early in pregnancy is common. If you’ve noticed any bleeding, you may be concerned. But keep in mind that bleeding alone doesn’t mean something is wrong. Call your healthcare provider right away, though. He or she may ask you questions like these to help find the cause of your bleeding:  When did your bleeding start?  Is your bleeding very light (spotting) or is it like a period?  Is the blood bright red or brownish?  Have you had sexual intercourse recently?  Have you had pain or cramping?  Have you felt dizzy or faint?  Monitoring your pregnancy  Bleeding will often stop as quickly as it began. Your pregnancy may go on a normal path again. You may need to make a few extra prenatal visits.  But you and your baby will most likely be fine.  [employer] last reviewed this educational content on 6/1/2016  © 3056-5742 [employer]. [address] [place] [address], [place] [address]. All rights reserved. This information is not intended as a substitute for professional medical care. Always follow your healthcare professional's instructions.        Abdominal Pain and Early Pregnancy    The tests you had show that you are pregnant, but the exact cause of your pain isn’t clear.   Some pain and bleeding are common early in pregnancy. Often they stop, and you can go on to have a normal pregnancy and baby. Other times the pain or bleeding can be signs of a miscarriage or ectopic pregnancy. An ectopic pregnancy is a very serious problem. At this time it is unclear if your pregnancy will continue normally, if you will have a miscarriage, or if you could have an ectopic pregnancy. Below is some information about this.   Miscarriage  At this time we don’t know whether you will have a miscarriage, or if things will clear up and your pregnancy will continue normally. We understand that this is emotionally difficult. There is little we can say to change the way you feel. But understand that miscarriages are common.   About 1 or 2 out of every 10 pregnancies end this way. Some end even before you know you are pregnant. This happens for a number of reasons, and usually we never figure out why. It’s important you know that it is not your fault. It didn’t happen because you did anything wrong.   Having sex or exercising does not cause a miscarriage. These activities are usually safe unless you have pain or bleeding or your healthcare provider tells you to stop. Even minor falls won’t cause a miscarriage. Miscarriages happen because things were not developing as they were supposed to. No medicine can prevent a miscarriage.   Ectopic pregnancy  In a normal pregnancy, the fertilized egg attaches to the wall of the womb  (uterus). In an ectopic or tubal pregnancy, the fertilized egg attaches outside the uterus, usually in the fallopian tube. Very rarely, the egg attaches to an ovary or somewhere else in the abdomen. An ectopic pregnancy is much less common than a miscarriage, but it is very serious. The baby can't survive, and as it grows it can rupture the tube. This can cause internal bleeding and even death. Risk factors for an ectopic are:   An ectopic pregnancy in the past  Pelvic inflammatory disease (PID)  Endometriosis  Smoking  An IUD  Additional tests  Because we don’t know what’s causing your symptoms, you will need more tests to figure out what the problem is. You may need the following.   Ultrasound  An ultrasound can usually find a normal pregnancy as early as 4 to 5 weeks along. If the ultrasound does not show the baby inside the uterus, it means one of the following.   You have a normal pregnancy less than 4 weeks along  You are having or recently had a miscarriage  You have an ectopic pregnancy  Quantitative HCG  This test measures the amount of a pregnancy hormone in your blood. Comparing today's test result to a repeat test in 2 days will show whether you have a normal pregnancy.   Laparoscopy  This is a type of surgery. The healthcare provider will put a tube with a light inside your belly (abdomen) to look directly at your pelvic organs. This test is used when it is not safe to wait 2 days for blood test results.   Important information  If you do have an ectopic pregnancy, there is a small chance that the growing fetus can tear the fallopian tube. This can cause severe internal bleeding. If this happens, you may have:   Sudden severe pain in your lower abdomen  Vaginal bleeding  Weakness, dizziness, and sometimes fainting  If any of these symptoms occur:  Call 911or return right away to the hospital.  Don't drive yourself.  Don't go to your healthcare provider's office or to a clinic. Go to the hospital.  Home  care  Follow these guidelines to help care for yourself at home:   Rest until your next exam. Don’t do anything strenuous.  Eat a light diet with foods that are easy to digest.  Don’t have sex until your healthcare provider says it’s OK.  Follow-up care  Follow up with your healthcare provider, or as advised. If you were told to have a repeat blood test in 2 days, it’s important to get it done.   If you had an X-ray or ultrasound, a radiologist will review it. You will be told of any new findings that may affect your care.   Call 911  Call 911 if you have any of these:   Severe pain and very heavy bleeding  Severe lightheadedness, passing out, or fainting  Rapid heart rate  Trouble breathing  Confused or difficulty waking up  When to seek medical advice  Call your healthcare provider right away if any of these occur:   The pain in your abdomen gets worse, either suddenly or gradually.  You are dizzy or weak when you stand.  You have heavy vaginal bleeding. This means soaking 1 pad an hour for 3 hours.  You have vaginal bleeding for more than 5 days.  You have repeated vomiting or diarrhea.  The pain in your abdomen moves to the lower right.  You have blood in your vomit or bowel movements. This will be dark red or black.  You have a fever of 100.4ºF (38ºC) or higher, or as directed by your healthcare provider.  [employer] last reviewed this educational content on 9/1/2019 © 2000-2020 [employer]. [address] [place] [address], [place] [address]. All rights reserved. This information is not intended as a substitute for professional medical care. Always follow your healthcare professional's instructions.

## 2024-03-30 LAB
HBV SURFACE AG SER-ACNC: <0.1 [IU]/L
HBV SURFACE AG SERPL QL IA: NONREACTIVE
HCV AB SERPL QL IA: NONREACTIVE
RUBV IGG SER QL: POSITIVE
RUBV IGG SER-ACNC: 86.7 IU/ML (ref 10–?)
T PALLIDUM AB SER QL IA: NONREACTIVE

## 2024-04-01 DIAGNOSIS — Z83.2 FAMILY HISTORY OF FACTOR V LEIDEN MUTATION: Primary | ICD-10-CM

## 2024-04-01 LAB
C TRACH DNA SPEC QL NAA+PROBE: NEGATIVE
F5 P.R506Q BLD/T QL: NORMAL
N GONORRHOEA DNA SPEC QL NAA+PROBE: NEGATIVE
VZV IGG SER IA-ACNC: 94.69 (ref 165–?)

## 2024-04-03 LAB
HGB A2 MFR BLD: 2.4 % (ref 1.5–3.5)
HGB PNL BLD ELPH: 97.6 % (ref 95.5–100)

## 2024-04-04 PROBLEM — Z28.39 MATERNAL VARICELLA, NON-IMMUNE (HCC): Status: ACTIVE | Noted: 2024-04-04

## 2024-04-04 PROBLEM — O09.899 MATERNAL VARICELLA, NON-IMMUNE (HCC): Status: ACTIVE | Noted: 2024-04-04

## 2024-04-05 PROBLEM — D68.2: Status: ACTIVE | Noted: 2024-02-23

## 2024-04-09 ENCOUNTER — TELEPHONE (OUTPATIENT)
Dept: OBGYN CLINIC | Facility: CLINIC | Age: 24
End: 2024-04-09

## 2024-04-09 DIAGNOSIS — D68.52 PROTHROMBIN MUTATION (HCC): Primary | ICD-10-CM

## 2024-04-09 NOTE — TELEPHONE ENCOUNTER
----- Message from Sanjuana Mcclellan CNM sent at 4/5/2024  4:11 PM CDT -----  Please call pt and notify that Factor II prothrombin mutation tested positive and heterozygous.  She needs an hematology consult ASAP and then MFM.   MESHA Rodrigues

## 2024-04-09 NOTE — TELEPHONE ENCOUNTER
Name and  verified    Patient given PF results and recommendations. Information for Dr. Armstrong provided.  Referral placed.

## 2024-04-10 ENCOUNTER — HOSPITAL ENCOUNTER (OUTPATIENT)
Dept: ULTRASOUND IMAGING | Facility: HOSPITAL | Age: 24
Discharge: HOME | End: 2024-04-10
Attending: ADVANCED PRACTICE MIDWIFE
Payer: COMMERCIAL

## 2024-04-10 ENCOUNTER — LAB ENCOUNTER (OUTPATIENT)
Dept: LAB | Facility: HOSPITAL | Age: 24
End: 2024-04-10
Attending: ADVANCED PRACTICE MIDWIFE
Payer: COMMERCIAL

## 2024-04-10 ENCOUNTER — ROUTINE PRENATAL (OUTPATIENT)
Dept: OBGYN CLINIC | Facility: CLINIC | Age: 24
End: 2024-04-10
Payer: COMMERCIAL

## 2024-04-10 VITALS
HEART RATE: 73 BPM | SYSTOLIC BLOOD PRESSURE: 111 MMHG | WEIGHT: 152 LBS | DIASTOLIC BLOOD PRESSURE: 75 MMHG | BODY MASS INDEX: 28 KG/M2

## 2024-04-10 DIAGNOSIS — O36.80X0 ENCOUNTER TO DETERMINE FETAL VIABILITY OF PREGNANCY, SINGLE OR UNSPECIFIED FETUS (HCC): ICD-10-CM

## 2024-04-10 DIAGNOSIS — Z34.01 PRENATAL CARE, FIRST PREGNANCY IN FIRST TRIMESTER (HCC): Primary | ICD-10-CM

## 2024-04-10 DIAGNOSIS — O02.1 MISSED ABORTION (HCC): ICD-10-CM

## 2024-04-10 DIAGNOSIS — O20.0 THREATENED ABORTION, ANTEPARTUM (HCC): ICD-10-CM

## 2024-04-10 DIAGNOSIS — O26.859 SPOTTING IN EARLY PREGNANCY (HCC): ICD-10-CM

## 2024-04-10 DIAGNOSIS — D68.52 PROTHROMBIN MUTATION (HCC): ICD-10-CM

## 2024-04-10 LAB — B-HCG SERPL-ACNC: 9764.1 MIU/ML

## 2024-04-10 PROCEDURE — 84702 CHORIONIC GONADOTROPIN TEST: CPT

## 2024-04-10 PROCEDURE — 76817 TRANSVAGINAL US OBSTETRIC: CPT | Performed by: ADVANCED PRACTICE MIDWIFE

## 2024-04-10 PROCEDURE — 76801 OB US < 14 WKS SINGLE FETUS: CPT | Performed by: ADVANCED PRACTICE MIDWIFE

## 2024-04-10 PROCEDURE — 36415 COLL VENOUS BLD VENIPUNCTURE: CPT

## 2024-04-10 NOTE — PROGRESS NOTES
Pt back from ultrasound   Results: CONCLUSION:   Findings compatible with fetal demise.     Pt counselled on expectant management medical management or surgical management  Desires expectant management.    Warning sign s reviewed and when to go to ER  HCG today  F/U visit in 1 week

## 2024-04-10 NOTE — TELEPHONE ENCOUNTER
12w6d  Pt had mild spotting and cramping that started on 4/9 and heavier today with a little more frequent cramping.    Pls advise

## 2024-04-10 NOTE — PROGRESS NOTES
Tayler, , is at 12w6d, here for her JERRY visit.  Currently, she has been experiencing bleeding. Started about 3 days ago, dark brown. Today turned bright red.     Detailed Family history taken with mother present. Mother does not remember if she has Factor V but knows that at least 2 of her sisters do/did. All sisters have had multiple term pregnancies, no SABs that she is aware of. No one has had any strokes. One sister, who was positive for Factor V also had another condition called Wagners Granularmatosis and eventually  of that. She did experience a blood clot related to surgery.    Vital signs and weight reviewed  See flowsheets   No audible FHTs    Assessment/Plan: Vaginal and urine cultures and GC/CT sent.   Stat/hold/call scan ordered for viability  Factor V/II: Hematology consult placed and patient to schedule for plan. Phone consult w/ Dr Ulloa, need detailed family H/O DVT events. Will defer to Hematology regarding anticoags.  Next visit: Pending today's scan    Reviewed:   Prenatal visit schedule  Danger signs    Pt verbalized understanding. All questions answered. No barriers to learning identified

## 2024-04-10 NOTE — TELEPHONE ENCOUNTER
Name and  verified    Patient 12w6d  based no  ultrasound. Patient reports spotting that began last night. Initially darker red and now beginning to look like fresh blood. Enough to wear a panty liner. Increased bleeding and cramping.     Had ultrasound on   CONCLUSION:   1. Single live early intrauterine gestation measures 6 weeks 6 days.  SINDHU 10/17/2024.   2. No subchorionic hemorrhage.   3. Normal bilateral ovaries.  Corpus luteum left ovary measures 1.8 x 1.6 cm     Factor II prothrombin mutation tested positive and heterozygous     Blood Type- B Positive     Scheduled with TM

## 2024-04-11 ENCOUNTER — TELEPHONE (OUTPATIENT)
Dept: OBGYN CLINIC | Facility: CLINIC | Age: 24
End: 2024-04-11

## 2024-04-11 DIAGNOSIS — O02.1 MISSED ABORTION (HCC): Primary | ICD-10-CM

## 2024-04-11 LAB
BV BACTERIA DNA VAG QL NAA+PROBE: NEGATIVE
C GLABRATA DNA VAG QL NAA+PROBE: NEGATIVE
C KRUSEI DNA VAG QL NAA+PROBE: NEGATIVE
C TRACH DNA SPEC QL NAA+PROBE: NEGATIVE
CANDIDA DNA VAG QL NAA+PROBE: NEGATIVE
N GONORRHOEA DNA SPEC QL NAA+PROBE: NEGATIVE
T VAGINALIS DNA VAG QL NAA+PROBE: NEGATIVE

## 2024-04-11 NOTE — TELEPHONE ENCOUNTER
Name and  verified    Patient reports mild cramping and bleeding. Took otc pain relief and reports cramping decreased. Will repeat hcg before visit with TM.

## 2024-04-11 NOTE — TELEPHONE ENCOUNTER
----- Message from Kandi Leonard CNM sent at 4/11/2024 10:59 AM CDT -----  Missed ab I spoke with her yesterday Please call assess her bleeding she should have an appt with Lilia on Wednesday  Repeat HCG the Tuesday before her appt  I put in the order

## 2024-04-17 ENCOUNTER — LAB ENCOUNTER (OUTPATIENT)
Dept: LAB | Facility: HOSPITAL | Age: 24
End: 2024-04-17
Attending: ADVANCED PRACTICE MIDWIFE
Payer: COMMERCIAL

## 2024-04-17 ENCOUNTER — OFFICE VISIT (OUTPATIENT)
Dept: OBGYN CLINIC | Facility: CLINIC | Age: 24
End: 2024-04-17

## 2024-04-17 VITALS
SYSTOLIC BLOOD PRESSURE: 120 MMHG | HEIGHT: 63 IN | WEIGHT: 153 LBS | HEART RATE: 76 BPM | DIASTOLIC BLOOD PRESSURE: 78 MMHG | BODY MASS INDEX: 27.11 KG/M2

## 2024-04-17 DIAGNOSIS — O03.4 INCOMPLETE SPONTANEOUS ABORTION (HCC): Primary | ICD-10-CM

## 2024-04-17 DIAGNOSIS — O02.1 MISSED ABORTION (HCC): ICD-10-CM

## 2024-04-17 LAB — B-HCG SERPL-ACNC: 642.8 MIU/ML

## 2024-04-17 PROCEDURE — 99212 OFFICE O/P EST SF 10 MIN: CPT | Performed by: ADVANCED PRACTICE MIDWIFE

## 2024-04-17 PROCEDURE — 36415 COLL VENOUS BLD VENIPUNCTURE: CPT

## 2024-04-17 PROCEDURE — 84702 CHORIONIC GONADOTROPIN TEST: CPT

## 2024-04-17 NOTE — PROGRESS NOTES
Chief Complaint   Patient presents with    Follow - Up     Mild cramping , light spotting , everything passed through,         HPI:   Tayler is 23 year old , here today for SAB follow-up. Reports after last Wednesday when diagnosed with non-viable pregnancy, she continued to bleed and, on Friday, bleeding and pain was so heavy that she went to ED where she passed the products of conception. Only light spotting and cramping since.   Emotional but coping. Continues with plan to see Hematology.    Patient Active Problem List   Diagnosis    Prothrombin mutation (HCC)    Palpitations    SVT (supraventricular tachycardia) (HCC)    Maternal varicella, non-immune (HCC)        Note: This is a gyn only visit. Pt has PCP and is referred back to PCP for care of any non-gyn concerns listed above in the Problem List.    Medications (Active prior to today's visit):  No current outpatient medications on file.       Allergies:  No Known Allergies    ROS:  Review of Systems   Constitutional: Negative.    Genitourinary:  Positive for pelvic pain (see HPI) and vaginal bleeding (see HPI).       PHYSICAL EXAM:  Vitals:    24 1323   BP: 120/78   Pulse: 76     Physical Exam  Vitals reviewed.   Constitutional:       Appearance: Normal appearance.   HENT:      Head: Normocephalic.   Pulmonary:      Effort: Pulmonary effort is normal.   Neurological:      Mental Status: She is alert and oriented to person, place, and time.   Psychiatric:         Behavior: Behavior normal.         Thought Content: Thought content normal.         Judgment: Judgment normal.        bHCG 4/10 was 9,764  bHCG  was 5,417  HgB  was 12.7  Blood type B positive  Confirmed non-viable pregnancy 4/10     ASSESSMENT/PLAN:     Tayler was seen today for follow - up.    Diagnoses and all orders for this visit:    Incomplete spontaneous  (HCC)       Repeat bHCG today and will follow to zero  Advised continuation of plan for Hematology consult as SAB  can be related to Factor V    Follow-up/Return to clinic: PRN    Patient verbalized understanding, All questions answered. No barriers to learning identified

## 2024-04-23 ENCOUNTER — TELEPHONE (OUTPATIENT)
Dept: OBGYN UNIT | Facility: HOSPITAL | Age: 24
End: 2024-04-23

## (undated) NOTE — LETTER
3/20/2019          To Whom It May Concern:    Zaina Fallon is currently under my medical care. She is cleared to continue track practice with no restrictions. If you require additional information please contact our office.         Sincerely,        Si

## (undated) NOTE — LETTER
[facility] [employer] of Child Health Examination       Student's Name  Tayler Forbes Birth Date Title                           Date     Signature HEALTH HISTORY          TO BE COMPLETED AND SIGNED BY PARENT/GUARDIAN AND VERIFIED BY HEALTH CARE PROVIDER    ALLERGIES  (Food, drug, insect, other)  Patient has no known allergies.  MEDICATION  (List all prescribed or taken on a regular basis.)  No current /74 (BP Location: Left arm, Patient Position: Sitting, Cuff Size: adult)   Pulse 63   Temp 98.1 °F (36.7 °C) (Oral)   Resp 18   Ht 5' 3\" (1.6 m)   Wt 127 lb 12.8 oz (58 kg)   LMP 07/31/2018 (Exact Date)   BMI 22.64 kg/m²     DIABETES SCREENING  BMI> Mouth/Dental Yes  Spinal examination Yes    Cardiovascular/HTN Yes  Nutritional status Yes    Respiratory Yes                   Diagnosis of Asthma: No Mental Health Yes        Currently Prescribed Asthma Medication:            Quick-relief  medication (e.